# Patient Record
Sex: FEMALE | Race: BLACK OR AFRICAN AMERICAN | NOT HISPANIC OR LATINO | URBAN - METROPOLITAN AREA
[De-identification: names, ages, dates, MRNs, and addresses within clinical notes are randomized per-mention and may not be internally consistent; named-entity substitution may affect disease eponyms.]

---

## 2017-11-08 ENCOUNTER — OUTPATIENT (OUTPATIENT)
Dept: OUTPATIENT SERVICES | Facility: HOSPITAL | Age: 62
LOS: 1 days | End: 2017-11-08
Payer: COMMERCIAL

## 2017-11-08 DIAGNOSIS — Z22.321 CARRIER OR SUSPECTED CARRIER OF METHICILLIN SUSCEPTIBLE STAPHYLOCOCCUS AUREUS: ICD-10-CM

## 2017-11-08 LAB
MRSA PCR RESULT.: NEGATIVE — SIGNIFICANT CHANGE UP
S AUREUS DNA NOSE QL NAA+PROBE: NEGATIVE — SIGNIFICANT CHANGE UP

## 2017-11-08 PROCEDURE — 87641 MR-STAPH DNA AMP PROBE: CPT

## 2017-11-14 ENCOUNTER — RESULT REVIEW (OUTPATIENT)
Age: 62
End: 2017-11-14

## 2017-11-14 ENCOUNTER — INPATIENT (INPATIENT)
Facility: HOSPITAL | Age: 62
LOS: 2 days | Discharge: NURSING, REHAB OR LONG-TERM CARE | DRG: 470 | End: 2017-11-17
Attending: ORTHOPAEDIC SURGERY | Admitting: ORTHOPAEDIC SURGERY
Payer: COMMERCIAL

## 2017-11-14 VITALS
HEART RATE: 69 BPM | OXYGEN SATURATION: 100 % | DIASTOLIC BLOOD PRESSURE: 70 MMHG | SYSTOLIC BLOOD PRESSURE: 170 MMHG | HEIGHT: 66 IN | RESPIRATION RATE: 20 BRPM | WEIGHT: 205.25 LBS | TEMPERATURE: 97 F

## 2017-11-14 DIAGNOSIS — M19.90 UNSPECIFIED OSTEOARTHRITIS, UNSPECIFIED SITE: ICD-10-CM

## 2017-11-14 DIAGNOSIS — Z96.652 PRESENCE OF LEFT ARTIFICIAL KNEE JOINT: Chronic | ICD-10-CM

## 2017-11-14 PROBLEM — Z00.00 ENCOUNTER FOR PREVENTIVE HEALTH EXAMINATION: Noted: 2017-11-14

## 2017-11-14 PROCEDURE — 73560 X-RAY EXAM OF KNEE 1 OR 2: CPT | Mod: 26,RT

## 2017-11-14 RX ORDER — ACETAMINOPHEN 500 MG
650 TABLET ORAL EVERY 6 HOURS
Qty: 0 | Refills: 0 | Status: DISCONTINUED | OUTPATIENT
Start: 2017-11-14 | End: 2017-11-17

## 2017-11-14 RX ORDER — CELECOXIB 200 MG/1
200 CAPSULE ORAL
Qty: 0 | Refills: 0 | Status: DISCONTINUED | OUTPATIENT
Start: 2017-11-16 | End: 2017-11-17

## 2017-11-14 RX ORDER — POLYETHYLENE GLYCOL 3350 17 G/17G
17 POWDER, FOR SOLUTION ORAL DAILY
Qty: 0 | Refills: 0 | Status: DISCONTINUED | OUTPATIENT
Start: 2017-11-14 | End: 2017-11-17

## 2017-11-14 RX ORDER — MORPHINE SULFATE 50 MG/1
2 CAPSULE, EXTENDED RELEASE ORAL EVERY 4 HOURS
Qty: 0 | Refills: 0 | Status: DISCONTINUED | OUTPATIENT
Start: 2017-11-14 | End: 2017-11-17

## 2017-11-14 RX ORDER — ONDANSETRON 8 MG/1
4 TABLET, FILM COATED ORAL EVERY 6 HOURS
Qty: 0 | Refills: 0 | Status: DISCONTINUED | OUTPATIENT
Start: 2017-11-14 | End: 2017-11-17

## 2017-11-14 RX ORDER — HYDROMORPHONE HYDROCHLORIDE 2 MG/ML
0.5 INJECTION INTRAMUSCULAR; INTRAVENOUS; SUBCUTANEOUS
Qty: 0 | Refills: 0 | Status: DISCONTINUED | OUTPATIENT
Start: 2017-11-14 | End: 2017-11-17

## 2017-11-14 RX ORDER — CEFAZOLIN SODIUM 1 G
2000 VIAL (EA) INJECTION EVERY 8 HOURS
Qty: 0 | Refills: 0 | Status: COMPLETED | OUTPATIENT
Start: 2017-11-14 | End: 2017-11-15

## 2017-11-14 RX ORDER — ASPIRIN/CALCIUM CARB/MAGNESIUM 324 MG
325 TABLET ORAL
Qty: 0 | Refills: 0 | Status: DISCONTINUED | OUTPATIENT
Start: 2017-11-14 | End: 2017-11-17

## 2017-11-14 RX ORDER — ASCORBIC ACID 60 MG
1 TABLET,CHEWABLE ORAL
Qty: 0 | Refills: 0 | COMMUNITY

## 2017-11-14 RX ORDER — MORPHINE SULFATE 50 MG/1
4 CAPSULE, EXTENDED RELEASE ORAL ONCE
Qty: 0 | Refills: 0 | Status: DISCONTINUED | OUTPATIENT
Start: 2017-11-14 | End: 2017-11-17

## 2017-11-14 RX ORDER — FAMOTIDINE 10 MG/ML
20 INJECTION INTRAVENOUS EVERY 12 HOURS
Qty: 0 | Refills: 0 | Status: DISCONTINUED | OUTPATIENT
Start: 2017-11-14 | End: 2017-11-17

## 2017-11-14 RX ORDER — OLMESARTAN MEDOXOMIL-HYDROCHLOROTHIAZIDE 25; 40 MG/1; MG/1
1 TABLET, FILM COATED ORAL
Qty: 0 | Refills: 0 | COMMUNITY

## 2017-11-14 RX ORDER — DOCUSATE SODIUM 100 MG
100 CAPSULE ORAL THREE TIMES A DAY
Qty: 0 | Refills: 0 | Status: DISCONTINUED | OUTPATIENT
Start: 2017-11-14 | End: 2017-11-17

## 2017-11-14 RX ORDER — DIPHENHYDRAMINE HCL 50 MG
25 CAPSULE ORAL AT BEDTIME
Qty: 0 | Refills: 0 | Status: DISCONTINUED | OUTPATIENT
Start: 2017-11-14 | End: 2017-11-17

## 2017-11-14 RX ORDER — MAGNESIUM HYDROXIDE 400 MG/1
30 TABLET, CHEWABLE ORAL DAILY
Qty: 0 | Refills: 0 | Status: DISCONTINUED | OUTPATIENT
Start: 2017-11-14 | End: 2017-11-17

## 2017-11-14 RX ORDER — SENNA PLUS 8.6 MG/1
2 TABLET ORAL AT BEDTIME
Qty: 0 | Refills: 0 | Status: DISCONTINUED | OUTPATIENT
Start: 2017-11-14 | End: 2017-11-17

## 2017-11-14 RX ORDER — SODIUM CHLORIDE 9 MG/ML
1000 INJECTION, SOLUTION INTRAVENOUS
Qty: 0 | Refills: 0 | Status: DISCONTINUED | OUTPATIENT
Start: 2017-11-14 | End: 2017-11-17

## 2017-11-14 RX ORDER — PREGABALIN 225 MG/1
1 CAPSULE ORAL
Qty: 0 | Refills: 0 | COMMUNITY

## 2017-11-14 RX ORDER — OXYCODONE HYDROCHLORIDE 5 MG/1
10 TABLET ORAL EVERY 4 HOURS
Qty: 0 | Refills: 0 | Status: DISCONTINUED | OUTPATIENT
Start: 2017-11-14 | End: 2017-11-17

## 2017-11-14 RX ADMIN — SODIUM CHLORIDE 100 MILLILITER(S): 9 INJECTION, SOLUTION INTRAVENOUS at 15:14

## 2017-11-14 RX ADMIN — Medication 325 MILLIGRAM(S): at 21:10

## 2017-11-14 RX ADMIN — Medication 100 MILLIGRAM(S): at 19:41

## 2017-11-14 RX ADMIN — FAMOTIDINE 20 MILLIGRAM(S): 10 INJECTION INTRAVENOUS at 21:10

## 2017-11-14 RX ADMIN — Medication 100 MILLIGRAM(S): at 21:10

## 2017-11-14 NOTE — H&P ADULT - NSHPPHYSICALEXAM_GEN_ALL_CORE
MSK- RIGHT LE no erythema/sts/ecchymosis, decreased ROM secondary to pain, SLT INTACT, DP/PT 2+, EHL/TA/GS 5/5  Rest of PE per medical clearance

## 2017-11-14 NOTE — H&P ADULT - HISTORY OF PRESENT ILLNESS
61yo f c/o right knee pain "for years." Pt. denies any accident or injury to right knee. Pt. has had left tkr done about 5y. Pt. c/o pain inright knee and has difficulty with bending. Pt. only takes indomethacin when right knee is too stiff. Denies any numbness/tingling. Thad miller walker assistance Pt. did physical therapy when left tkr was performed. Presents today for elective RIGHT TKR, ROBOTIC ASSISTED.

## 2017-11-14 NOTE — H&P ADULT - NSHPLABSRESULTS_GEN_ALL_CORE
preop cbc/bmp/coags (no PTT) /ua wnl per medical clearance   EKG- NSR  CXR- wnl per medical clearance

## 2017-11-14 NOTE — PROGRESS NOTE ADULT - SUBJECTIVE AND OBJECTIVE BOX
Orthopaedics Post Op Check    Procedure: Right total knee replacement   Surgeon: Dr. Orta     Pt comfortable, without complaints  Denies CP, SOB, N/V, numbness/tingling     Vital Signs Last 24 Hrs  T(C): 37.1 (14 Nov 2017 14:58), Max: 37.1 (14 Nov 2017 14:58)  T(F): 98.8 (14 Nov 2017 14:58), Max: 98.8 (14 Nov 2017 14:58)  HR: 68 (14 Nov 2017 15:13) (68 - 88)  BP: 136/72 (14 Nov 2017 15:13) (136/72 - 170/70)  BP(mean): 97 (14 Nov 2017 15:13) (97 - 97)  RR: 14 (14 Nov 2017 15:13) (14 - 20)  SpO2: 100% (14 Nov 2017 15:13) (100% - 100%)  AVSS, NAD    Dressing C/D/I  General: Pt Alert and oriented     Pulses: DP pulses 2+ bilaterally  Sensation: SLT in tact and equal to distal bilateral lower extremities   Motor: EHL/FHL/TA/GS 5/5 motor strength bilateral lower extremities       Post op XR: right knee prosthesis in place     A/P: 62yFemale POD#0 s/p right total knee replacement   - Stable  - Pain Control  - DVT ppx:  bid  - Post op abx: Ancef	  - PT, WBS: WBAT  - F/U AM Labs

## 2017-11-14 NOTE — PHYSICAL THERAPY INITIAL EVALUATION ADULT - PERTINENT HX OF CURRENT PROBLEM, REHAB EVAL
63yo f c/o right knee pain "for years." Pt. denies any accident or injury to right knee. Pt. has had left tkr done about 5y. Pt. c/o pain in right knee and has difficulty with bending. Pt. only takes indomethacin when right knee is too stiff.

## 2017-11-14 NOTE — PHYSICAL THERAPY INITIAL EVALUATION ADULT - GAIT DEVIATIONS NOTED, PT EVAL
decreased gait speed, mild unsteadiness, decreased WBing throughout RLE, difficulty progressing RLE (improved throughout gait)/decreased step length/decreased stride length

## 2017-11-14 NOTE — BRIEF OPERATIVE NOTE - PROCEDURE
<<-----Click on this checkbox to enter Procedure Total knee arthroplasty  11/14/2017    Active  [id number]

## 2017-11-14 NOTE — PHYSICAL THERAPY INITIAL EVALUATION ADULT - ADDITIONAL COMMENTS
Patient lives in apartment, 9 steps to enter. Patient denies DME, home health assistance, and history of falls.

## 2017-11-15 ENCOUNTER — TRANSCRIPTION ENCOUNTER (OUTPATIENT)
Age: 62
End: 2017-11-15

## 2017-11-15 LAB
ANION GAP SERPL CALC-SCNC: 11 MMOL/L — SIGNIFICANT CHANGE UP (ref 5–17)
BUN SERPL-MCNC: 15 MG/DL — SIGNIFICANT CHANGE UP (ref 7–23)
CALCIUM SERPL-MCNC: 8.8 MG/DL — SIGNIFICANT CHANGE UP (ref 8.4–10.5)
CHLORIDE SERPL-SCNC: 95 MMOL/L — LOW (ref 96–108)
CO2 SERPL-SCNC: 27 MMOL/L — SIGNIFICANT CHANGE UP (ref 22–31)
CREAT SERPL-MCNC: 0.67 MG/DL — SIGNIFICANT CHANGE UP (ref 0.5–1.3)
GLUCOSE SERPL-MCNC: 106 MG/DL — HIGH (ref 70–99)
HCT VFR BLD CALC: 33.3 % — LOW (ref 34.5–45)
HGB BLD-MCNC: 10.7 G/DL — LOW (ref 11.5–15.5)
MCHC RBC-ENTMCNC: 28.4 PG — SIGNIFICANT CHANGE UP (ref 27–34)
MCHC RBC-ENTMCNC: 32.1 G/DL — SIGNIFICANT CHANGE UP (ref 32–36)
MCV RBC AUTO: 88.3 FL — SIGNIFICANT CHANGE UP (ref 80–100)
PLATELET # BLD AUTO: 313 K/UL — SIGNIFICANT CHANGE UP (ref 150–400)
POTASSIUM SERPL-MCNC: 4.1 MMOL/L — SIGNIFICANT CHANGE UP (ref 3.5–5.3)
POTASSIUM SERPL-SCNC: 4.1 MMOL/L — SIGNIFICANT CHANGE UP (ref 3.5–5.3)
RBC # BLD: 3.77 M/UL — LOW (ref 3.8–5.2)
RBC # FLD: 14.9 % — SIGNIFICANT CHANGE UP (ref 10.3–16.9)
SODIUM SERPL-SCNC: 133 MMOL/L — LOW (ref 135–145)
WBC # BLD: 13.1 K/UL — HIGH (ref 3.8–10.5)
WBC # FLD AUTO: 13.1 K/UL — HIGH (ref 3.8–10.5)

## 2017-11-15 RX ORDER — SENNA PLUS 8.6 MG/1
2 TABLET ORAL
Qty: 0 | Refills: 0 | COMMUNITY
Start: 2017-11-15

## 2017-11-15 RX ORDER — ASPIRIN/CALCIUM CARB/MAGNESIUM 324 MG
1 TABLET ORAL
Qty: 0 | Refills: 0 | COMMUNITY
Start: 2017-11-15

## 2017-11-15 RX ORDER — OXYCODONE HYDROCHLORIDE 5 MG/1
1 TABLET ORAL
Qty: 0 | Refills: 0 | COMMUNITY
Start: 2017-11-15

## 2017-11-15 RX ORDER — DOCUSATE SODIUM 100 MG
1 CAPSULE ORAL
Qty: 0 | Refills: 0 | COMMUNITY
Start: 2017-11-15

## 2017-11-15 RX ORDER — LOSARTAN POTASSIUM 100 MG/1
50 TABLET, FILM COATED ORAL DAILY
Qty: 0 | Refills: 0 | Status: DISCONTINUED | OUTPATIENT
Start: 2017-11-15 | End: 2017-11-17

## 2017-11-15 RX ORDER — FAMOTIDINE 10 MG/ML
1 INJECTION INTRAVENOUS
Qty: 0 | Refills: 0 | COMMUNITY
Start: 2017-11-15

## 2017-11-15 RX ORDER — CELECOXIB 200 MG/1
1 CAPSULE ORAL
Qty: 0 | Refills: 0 | COMMUNITY
Start: 2017-11-15

## 2017-11-15 RX ORDER — DIPHENHYDRAMINE HCL 50 MG
1 CAPSULE ORAL
Qty: 0 | Refills: 0 | COMMUNITY
Start: 2017-11-15

## 2017-11-15 RX ORDER — MAGNESIUM HYDROXIDE 400 MG/1
30 TABLET, CHEWABLE ORAL
Qty: 0 | Refills: 0 | COMMUNITY
Start: 2017-11-15

## 2017-11-15 RX ORDER — ONDANSETRON 8 MG/1
0 TABLET, FILM COATED ORAL
Qty: 0 | Refills: 0 | COMMUNITY
Start: 2017-11-15

## 2017-11-15 RX ADMIN — Medication 100 MILLIGRAM(S): at 02:30

## 2017-11-15 RX ADMIN — MORPHINE SULFATE 2 MILLIGRAM(S): 50 CAPSULE, EXTENDED RELEASE ORAL at 02:02

## 2017-11-15 RX ADMIN — Medication 100 MILLIGRAM(S): at 05:29

## 2017-11-15 RX ADMIN — Medication 100 MILLIGRAM(S): at 20:31

## 2017-11-15 RX ADMIN — FAMOTIDINE 20 MILLIGRAM(S): 10 INJECTION INTRAVENOUS at 17:50

## 2017-11-15 RX ADMIN — OXYCODONE HYDROCHLORIDE 10 MILLIGRAM(S): 5 TABLET ORAL at 20:10

## 2017-11-15 RX ADMIN — Medication 650 MILLIGRAM(S): at 17:25

## 2017-11-15 RX ADMIN — OXYCODONE HYDROCHLORIDE 10 MILLIGRAM(S): 5 TABLET ORAL at 19:37

## 2017-11-15 RX ADMIN — Medication 325 MILLIGRAM(S): at 17:47

## 2017-11-15 RX ADMIN — OXYCODONE HYDROCHLORIDE 10 MILLIGRAM(S): 5 TABLET ORAL at 08:00

## 2017-11-15 RX ADMIN — LOSARTAN POTASSIUM 50 MILLIGRAM(S): 100 TABLET, FILM COATED ORAL at 21:38

## 2017-11-15 RX ADMIN — OXYCODONE HYDROCHLORIDE 10 MILLIGRAM(S): 5 TABLET ORAL at 07:02

## 2017-11-15 RX ADMIN — Medication 1 TABLET(S): at 12:36

## 2017-11-15 RX ADMIN — FAMOTIDINE 20 MILLIGRAM(S): 10 INJECTION INTRAVENOUS at 05:29

## 2017-11-15 RX ADMIN — Medication 650 MILLIGRAM(S): at 16:06

## 2017-11-15 RX ADMIN — POLYETHYLENE GLYCOL 3350 17 GRAM(S): 17 POWDER, FOR SOLUTION ORAL at 12:36

## 2017-11-15 RX ADMIN — MORPHINE SULFATE 2 MILLIGRAM(S): 50 CAPSULE, EXTENDED RELEASE ORAL at 02:20

## 2017-11-15 RX ADMIN — Medication 100 MILLIGRAM(S): at 13:45

## 2017-11-15 RX ADMIN — OXYCODONE HYDROCHLORIDE 10 MILLIGRAM(S): 5 TABLET ORAL at 11:29

## 2017-11-15 RX ADMIN — Medication 325 MILLIGRAM(S): at 05:29

## 2017-11-15 RX ADMIN — OXYCODONE HYDROCHLORIDE 10 MILLIGRAM(S): 5 TABLET ORAL at 12:00

## 2017-11-15 NOTE — DISCHARGE NOTE ADULT - PLAN OF CARE
Improvement in pain and ambulation after surgery No strenuous activity, heavy lifting, driving or returning to work until cleared by MD.  You may shower - dressing is water-resistant, no soaking in bathtubs.  Remove dressing after post op day 5-7, then leave incision open to air. Keep incision clean and dry.  Try to have regular bowel movements, take stool softener or laxative if necessary.  May take Pepcid or Zantac for upset stomach.  May take Aleve or Naproxen instead of Meloxicam.  Swelling may travel all the way down leg to foot, this is normal and will subside in a few weeks.  Call to schedule an appt with Dr. Parker for follow up, if you have staples or sutures they will be removed in office.  Contact your doctor if you experience: fever greater than 101.5, chills, chest pain, difficulty breathing, redness or excessive drainage around the incision, other concerns. No strenuous activity, heavy lifting, driving or returning to work until cleared by MD.  You may shower - dressing is water-resistant, no soaking in bathtubs.  Remove dressing after post op day 5-7, then leave incision open to air. Keep incision clean and dry.  Try to have regular bowel movements, take stool softener or laxative if necessary.  May take Pepcid or Zantac for upset stomach.  May take Aleve or Naproxen instead of Meloxicam.  Swelling may travel all the way down leg to foot, this is normal and will subside in a few weeks.  Call to schedule an appt with Dr. Orta for follow up, if you have staples or sutures they will be removed in office.  Contact your doctor if you experience: fever greater than 101.5, chills, chest pain, difficulty breathing, redness or excessive drainage around the incision, other concerns.  Please follow up with your primary care provider.

## 2017-11-15 NOTE — PROGRESS NOTE ADULT - SUBJECTIVE AND OBJECTIVE BOX
Ortho Progress Note    Subjective:  62y Female s/p  R TKA , POD# 1. Patient seen and examined, doing well, pain endorsed but controlled. No acute events overnight. Denies CP/SOB/N/V      Objective:    Vital Signs Last 24 Hrs  T(C): 37.3 (15 Nov 2017 05:13), Max: 37.7 (15 Nov 2017 01:55)  T(F): 99.1 (15 Nov 2017 05:13), Max: 99.9 (15 Nov 2017 01:55)  HR: 89 (15 Nov 2017 05:13) (56 - 89)  BP: 140/67 (15 Nov 2017 05:13) (135/83 - 171/81)  BP(mean): 97 (14 Nov 2017 15:13) (97 - 97)  RR: 17 (15 Nov 2017 05:13) (12 - 20)  SpO2: 97% (15 Nov 2017 05:13) (96% - 100%)    NAD, AOx3, comfortable    RLE  Dressing: C/D/I  Motor: 5/5 GS/TA/EHL/FHL    Sensation: SILT s/sp/dp/tib  Pulses:  DP/PT 2+ , toes/foot WWP            A/P:  62y Female s/p R TKA , POD# 1  -Stable  -Pain Control  -PT/WBAT  -DVT ppx:  BID  -Advance diet as tolerated  -f/u AM labs  -Dispo: home v rehab      Adonis Mccracken MD, PGY-1  [phone]

## 2017-11-15 NOTE — DISCHARGE NOTE ADULT - MEDICATION SUMMARY - MEDICATIONS TO TAKE
I will START or STAY ON the medications listed below when I get home from the hospital:    celecoxib 200 mg oral capsule  -- 1 cap(s) by mouth 2 times a day (before meals)  -- Indication: For Anti-inflammatory    oxyCODONE 10 mg oral tablet  -- 1 tab(s) by mouth every 4 hours, As needed, Moderate Pain  -- Indication: For Pain    aspirin 325 mg oral delayed release tablet  -- 1 tab(s) by mouth 2 times a day  -- Indication: For DVT prophylaxis    losartan 50 mg oral tablet  -- 1 tab(s) by mouth once a day  -- Indication: For Hypertension    aluminum hydroxide-magnesium hydroxide 200 mg-200 mg/5 mL oral suspension  -- 30 milliliter(s) by mouth 4 times a day, As needed, Indigestion  -- Indication: For Indigestion    magnesium hydroxide 8% oral suspension  -- 30 milliliter(s) by mouth once a day, As needed, Constipation  -- Indication: For Indigestion    diphenhydrAMINE 25 mg oral capsule  -- 1 cap(s) by mouth once a day (at bedtime), As needed, Insomnia  -- Indication: For Insomnia    ondansetron 2 mg/mL injectable solution  --  injectable   -- Indication: For Nausea    Benicar HCT 20 mg-12.5 mg oral tablet  -- 1 tab(s) by mouth once a day  -- Indication: For Hypertension    famotidine 20 mg oral tablet  -- 1 tab(s) by mouth every 12 hours  -- Indication: For Indigestion    docusate sodium 100 mg oral capsule  -- 1 cap(s) by mouth 3 times a day  -- Indication: For Constipation    senna oral tablet  -- 2 tab(s) by mouth once a day (at bedtime), As needed, Constipation  -- Indication: For Constipation    polyethylene glycol 3350 oral powder for reconstitution  -- 17 gram(s) by mouth once a day  -- Indication: For Constipation    Vitamin B12 500 mcg oral tablet  -- 1 tab(s) by mouth once a day  -- Indication: For Vitamin    Vitamin C 1000 mg oral tablet  -- 1 tab(s) by mouth once a day  -- Indication: For Vitamin

## 2017-11-15 NOTE — DISCHARGE NOTE ADULT - CARE PROVIDER_API CALL
Sharif Stroud), Orthopaedic Surgery  [address] [place] [address]  Phone: [phone]  Fax: [phone]

## 2017-11-15 NOTE — DISCHARGE NOTE ADULT - HOSPITAL COURSE
Admitted  Surgery RIGHT TKR  Noreen-op Antibiotics  Pain control  DVT prophylaxis- ASA  OOB/Physical Therapy Admitted  Surgery Right TKR  Noreen-op Antibiotics  Pain control  DVT prophylaxis - ASA 325mg BID  OOB/Physical Therapy

## 2017-11-15 NOTE — DISCHARGE NOTE ADULT - CARE PLAN
Principal Discharge DX:	Arthritis  Goal:	Improvement in pain and ambulation after surgery  Instructions for follow-up, activity and diet:	No strenuous activity, heavy lifting, driving or returning to work until cleared by MD.  You may shower - dressing is water-resistant, no soaking in bathtubs.  Remove dressing after post op day 5-7, then leave incision open to air. Keep incision clean and dry.  Try to have regular bowel movements, take stool softener or laxative if necessary.  May take Pepcid or Zantac for upset stomach.  May take Aleve or Naproxen instead of Meloxicam.  Swelling may travel all the way down leg to foot, this is normal and will subside in a few weeks.  Call to schedule an appt with Dr. Parker for follow up, if you have staples or sutures they will be removed in office.  Contact your doctor if you experience: fever greater than 101.5, chills, chest pain, difficulty breathing, redness or excessive drainage around the incision, other concerns. Principal Discharge DX:	Arthritis  Goal:	Improvement in pain and ambulation after surgery  Instructions for follow-up, activity and diet:	No strenuous activity, heavy lifting, driving or returning to work until cleared by MD.  You may shower - dressing is water-resistant, no soaking in bathtubs.  Remove dressing after post op day 5-7, then leave incision open to air. Keep incision clean and dry.  Try to have regular bowel movements, take stool softener or laxative if necessary.  May take Pepcid or Zantac for upset stomach.  May take Aleve or Naproxen instead of Meloxicam.  Swelling may travel all the way down leg to foot, this is normal and will subside in a few weeks.  Call to schedule an appt with Dr. Orta for follow up, if you have staples or sutures they will be removed in office.  Contact your doctor if you experience: fever greater than 101.5, chills, chest pain, difficulty breathing, redness or excessive drainage around the incision, other concerns.  Please follow up with your primary care provider.

## 2017-11-15 NOTE — DISCHARGE NOTE ADULT - MEDICATION SUMMARY - MEDICATIONS TO STOP TAKING
I will STOP taking the medications listed below when I get home from the hospital:    indomethacin 75 mg oral capsule, extended release  -- 1 cap(s) by mouth once a day

## 2017-11-15 NOTE — PROGRESS NOTE ADULT - SUBJECTIVE AND OBJECTIVE BOX
no complaints    vss    rle nvid  dress c d i  5/5 at/pt  no dct    s/p r tka pod 1    oob with pt  check labs and vitals  cont abx and anticoag  provide MARNIE stockings and CPM  disposition planning

## 2017-11-15 NOTE — DISCHARGE NOTE ADULT - PATIENT PORTAL LINK FT
“You can access the [employer] Patient Portal, offered by [facility], by registering with the following website: [email or web address]”

## 2017-11-15 NOTE — PROGRESS NOTE ADULT - SUBJECTIVE AND OBJECTIVE BOX
POST OPERATIVE DAY #:1   STATUS POST: Right  TKA                       SUBJECTIVE: Patient seen and examined. Pt. c/o pain in right knee but otherwise denies any sob/cp/n/v.     Pain:  controlled      OBJECTIVE:     Vital Signs Last 24 Hrs  T(C): 37.9 (15 Nov 2017 09:55), Max: 37.9 (15 Nov 2017 09:55)  T(F): 100.3 (15 Nov 2017 09:55), Max: 100.3 (15 Nov 2017 09:55)  HR: 85 (15 Nov 2017 09:55) (56 - 89)  BP: 153/72 (15 Nov 2017 09:55) (135/83 - 171/81)  BP(mean): 97 (14 Nov 2017 15:13) (97 - 97)  RR: 16 (15 Nov 2017 09:55) (12 - 20)  SpO2: 96% (15 Nov 2017 09:55) (96% - 100%)    Affected extremity:          Dressing: clean/dry/intact          Sensation: intact to light touch to patient's baseline         Motor exam:  firing EHL          warm, well-perfused; capillary refill < 3 seconds              I&O's Detail    14 Nov 2017 07:01  -  15 Nov 2017 07:00  --------------------------------------------------------  IN:    lactated ringers.: 1400 mL    Oral Fluid: 360 mL  Total IN: 1760 mL    OUT:    Voided: 950 mL  Total OUT: 950 mL    Total NET: 810 mL      15 Nov 2017 07:01  -  15 Nov 2017 12:29  --------------------------------------------------------  IN:  Total IN: 0 mL    OUT:    Voided: 1050 mL  Total OUT: 1050 mL    Total NET: -1050 mL          LABS:                        10.7   13.1  )-----------( 313      ( 15 Nov 2017 06:26 )             33.3     11-15    133<L>  |  95<L>  |  15  ----------------------------<  106<H>  4.1   |  27  |  0.67    Ca    8.8      15 Nov 2017 06:26            MEDICATIONS:    acetaminophen   Tablet 650 milliGRAM(s) Oral every 6 hours PRN  acetaminophen   Tablet. 650 milliGRAM(s) Oral every 6 hours PRN  diphenhydrAMINE   Capsule 25 milliGRAM(s) Oral at bedtime PRN  HYDROmorphone  Injectable 0.5 milliGRAM(s) IV Push every 10 minutes PRN  morphine  - Injectable 2 milliGRAM(s) IV Push every 4 hours PRN  morphine  - Injectable 4 milliGRAM(s) IV Push once  ondansetron Injectable 4 milliGRAM(s) IV Push every 6 hours PRN  oxyCODONE    IR 10 milliGRAM(s) Oral every 4 hours PRN    aspirin enteric coated 325 milliGRAM(s) Oral two times a day      RADIOLOGY & ADDITIONAL STUDIES:    ASSESSMENT AND PLAN:     1. Analgesic pain control  2. DVT prophylaxis: ASA    3. Weight Bearing Status:  Weight bearing as tolerated         4. Disposition: Home POST OPERATIVE DAY #:1   STATUS POST: Right  TKA                       SUBJECTIVE: Patient seen and examined. Pt. c/o pain in right knee but otherwise denies any sob/cp/n/v.     Pain:  controlled      OBJECTIVE:     Vital Signs Last 24 Hrs  T(C): 37.9 (15 Nov 2017 09:55), Max: 37.9 (15 Nov 2017 09:55)  T(F): 100.3 (15 Nov 2017 09:55), Max: 100.3 (15 Nov 2017 09:55)  HR: 85 (15 Nov 2017 09:55) (56 - 89)  BP: 153/72 (15 Nov 2017 09:55) (135/83 - 171/81)  BP(mean): 97 (14 Nov 2017 15:13) (97 - 97)  RR: 16 (15 Nov 2017 09:55) (12 - 20)  SpO2: 96% (15 Nov 2017 09:55) (96% - 100%)    Affected extremity: right le         Dressing: clean/dry/intact          Sensation: intact to light touch to patient's baseline         Motor exam:  DP/PT 2+, EHL/TA/GS 5/5 b/l les         warm, well-perfused; capillary refill < 3 seconds              I&O's Detail    14 Nov 2017 07:01  -  15 Nov 2017 07:00  --------------------------------------------------------  IN:    lactated ringers.: 1400 mL    Oral Fluid: 360 mL  Total IN: 1760 mL    OUT:    Voided: 950 mL  Total OUT: 950 mL    Total NET: 810 mL      15 Nov 2017 07:01  -  15 Nov 2017 12:29  --------------------------------------------------------  IN:  Total IN: 0 mL    OUT:    Voided: 1050 mL  Total OUT: 1050 mL    Total NET: -1050 mL          LABS:                        10.7   13.1  )-----------( 313      ( 15 Nov 2017 06:26 )             33.3     11-15    133<L>  |  95<L>  |  15  ----------------------------<  106<H>  4.1   |  27  |  0.67    Ca    8.8      15 Nov 2017 06:26            MEDICATIONS:    acetaminophen   Tablet 650 milliGRAM(s) Oral every 6 hours PRN  acetaminophen   Tablet. 650 milliGRAM(s) Oral every 6 hours PRN  diphenhydrAMINE   Capsule 25 milliGRAM(s) Oral at bedtime PRN  HYDROmorphone  Injectable 0.5 milliGRAM(s) IV Push every 10 minutes PRN  morphine  - Injectable 2 milliGRAM(s) IV Push every 4 hours PRN  morphine  - Injectable 4 milliGRAM(s) IV Push once  ondansetron Injectable 4 milliGRAM(s) IV Push every 6 hours PRN  oxyCODONE    IR 10 milliGRAM(s) Oral every 4 hours PRN    aspirin enteric coated 325 milliGRAM(s) Oral two times a day      RADIOLOGY & ADDITIONAL STUDIES: none    ASSESSMENT AND PLAN:     1. Analgesic pain control  2. DVT prophylaxis: ASA    3. Weight Bearing Status:  Weight bearing as tolerated         4. Disposition: Home

## 2017-11-16 LAB
ANION GAP SERPL CALC-SCNC: 12 MMOL/L — SIGNIFICANT CHANGE UP (ref 5–17)
BUN SERPL-MCNC: 13 MG/DL — SIGNIFICANT CHANGE UP (ref 7–23)
CALCIUM SERPL-MCNC: 8.8 MG/DL — SIGNIFICANT CHANGE UP (ref 8.4–10.5)
CHLORIDE SERPL-SCNC: 94 MMOL/L — LOW (ref 96–108)
CO2 SERPL-SCNC: 26 MMOL/L — SIGNIFICANT CHANGE UP (ref 22–31)
CREAT SERPL-MCNC: 0.78 MG/DL — SIGNIFICANT CHANGE UP (ref 0.5–1.3)
GLUCOSE SERPL-MCNC: 135 MG/DL — HIGH (ref 70–99)
HCT VFR BLD CALC: 31.9 % — LOW (ref 34.5–45)
HGB BLD-MCNC: 10.2 G/DL — LOW (ref 11.5–15.5)
MCHC RBC-ENTMCNC: 28.4 PG — SIGNIFICANT CHANGE UP (ref 27–34)
MCHC RBC-ENTMCNC: 32 G/DL — SIGNIFICANT CHANGE UP (ref 32–36)
MCV RBC AUTO: 88.9 FL — SIGNIFICANT CHANGE UP (ref 80–100)
PLATELET # BLD AUTO: 289 K/UL — SIGNIFICANT CHANGE UP (ref 150–400)
POTASSIUM SERPL-MCNC: 3.7 MMOL/L — SIGNIFICANT CHANGE UP (ref 3.5–5.3)
POTASSIUM SERPL-SCNC: 3.7 MMOL/L — SIGNIFICANT CHANGE UP (ref 3.5–5.3)
RBC # BLD: 3.59 M/UL — LOW (ref 3.8–5.2)
RBC # FLD: 14.8 % — SIGNIFICANT CHANGE UP (ref 10.3–16.9)
SODIUM SERPL-SCNC: 132 MMOL/L — LOW (ref 135–145)
SURGICAL PATHOLOGY STUDY: SIGNIFICANT CHANGE UP
WBC # BLD: 13.2 K/UL — HIGH (ref 3.8–10.5)
WBC # FLD AUTO: 13.2 K/UL — HIGH (ref 3.8–10.5)

## 2017-11-16 RX ADMIN — FAMOTIDINE 20 MILLIGRAM(S): 10 INJECTION INTRAVENOUS at 17:17

## 2017-11-16 RX ADMIN — CELECOXIB 200 MILLIGRAM(S): 200 CAPSULE ORAL at 17:17

## 2017-11-16 RX ADMIN — OXYCODONE HYDROCHLORIDE 10 MILLIGRAM(S): 5 TABLET ORAL at 17:18

## 2017-11-16 RX ADMIN — POLYETHYLENE GLYCOL 3350 17 GRAM(S): 17 POWDER, FOR SOLUTION ORAL at 11:04

## 2017-11-16 RX ADMIN — OXYCODONE HYDROCHLORIDE 10 MILLIGRAM(S): 5 TABLET ORAL at 02:00

## 2017-11-16 RX ADMIN — CELECOXIB 200 MILLIGRAM(S): 200 CAPSULE ORAL at 06:45

## 2017-11-16 RX ADMIN — OXYCODONE HYDROCHLORIDE 10 MILLIGRAM(S): 5 TABLET ORAL at 11:04

## 2017-11-16 RX ADMIN — FAMOTIDINE 20 MILLIGRAM(S): 10 INJECTION INTRAVENOUS at 06:00

## 2017-11-16 RX ADMIN — Medication 100 MILLIGRAM(S): at 05:59

## 2017-11-16 RX ADMIN — Medication 100 MILLIGRAM(S): at 21:15

## 2017-11-16 RX ADMIN — CELECOXIB 200 MILLIGRAM(S): 200 CAPSULE ORAL at 05:59

## 2017-11-16 RX ADMIN — OXYCODONE HYDROCHLORIDE 10 MILLIGRAM(S): 5 TABLET ORAL at 18:15

## 2017-11-16 RX ADMIN — Medication 1 TABLET(S): at 11:04

## 2017-11-16 RX ADMIN — Medication 325 MILLIGRAM(S): at 06:00

## 2017-11-16 RX ADMIN — OXYCODONE HYDROCHLORIDE 10 MILLIGRAM(S): 5 TABLET ORAL at 02:45

## 2017-11-16 RX ADMIN — Medication 325 MILLIGRAM(S): at 17:17

## 2017-11-16 RX ADMIN — Medication 30 MILLILITER(S): at 06:00

## 2017-11-16 RX ADMIN — OXYCODONE HYDROCHLORIDE 10 MILLIGRAM(S): 5 TABLET ORAL at 12:00

## 2017-11-16 RX ADMIN — Medication 100 MILLIGRAM(S): at 17:17

## 2017-11-16 RX ADMIN — CELECOXIB 200 MILLIGRAM(S): 200 CAPSULE ORAL at 17:57

## 2017-11-16 NOTE — PROGRESS NOTE ADULT - SUBJECTIVE AND OBJECTIVE BOX
Ortho Progress Note    Subjective:  62y Female s/p  R TKA , POD# 2. Patient seen and examined, doing well, pain endorsed but controlled. No acute events overnight. Denies CP/SOB/N/V      Objective:    Vital Signs Last 24 Hrs  T(C): 37.7 (16 Nov 2017 05:15), Max: 37.9 (15 Nov 2017 09:55)  T(F): 99.8 (16 Nov 2017 05:15), Max: 100.3 (15 Nov 2017 09:55)  HR: 84 (16 Nov 2017 05:15) (77 - 86)  BP: 103/67 (16 Nov 2017 05:15) (103/67 - 165/97)  BP(mean): --  RR: 18 (16 Nov 2017 05:15) (16 - 18)  SpO2: 95% (16 Nov 2017 05:15) (95% - 97%)  NAD, AOx3, comfortable    RLE  Dressing: C/D/I  Motor: 5/5 GS/TA/EHL/FHL    Sensation: SILT s/sp/dp/tib  Pulses:  DP/PT 2+ , toes/foot WWP            A/P:  62y Female s/p R TKA , POD# 2  -Stable  -Pain Control  -PT/WBAT  -DVT ppx:  BID  -Advance diet as tolerated  -f/u AM labs  -Dispo: rehab      Adonis Mccracken MD, PGY-1  [phone]

## 2017-11-16 NOTE — PROGRESS NOTE ADULT - SUBJECTIVE AND OBJECTIVE BOX
Ortho Note    Pt comfortable without complaints, pain controlled  Denies CP, SOB, N/V, numbness/tingling     Vital Signs Last 24 Hrs  AVSS    General: Pt Alert and oriented, NAD  DSG C/D/I Right knee, bulky wrap d/c'd  Pulses intact RLE  Sensation intact RLE  Motor: EHL/FHL/TA/GS 5/5 RLE                          10.2   13.2  )-----------( 289      ( 16 Nov 2017 07:01 )             31.9   16 Nov 2017 07:01    132    |  94     |  13     ----------------------------<  135    3.7     |  26     |  0.78           A/P: 62yFemale POD#2 s/p Right TKR  - Stable  - Pain Control  - DVT ppx: ASA  - PT, WBS: WBAT  - Fluid restrict for hyponatremia, f/u AM labs    Ortho Pager [phone]

## 2017-11-17 VITALS
OXYGEN SATURATION: 97 % | TEMPERATURE: 99 F | DIASTOLIC BLOOD PRESSURE: 55 MMHG | RESPIRATION RATE: 16 BRPM | SYSTOLIC BLOOD PRESSURE: 118 MMHG | HEART RATE: 63 BPM

## 2017-11-17 LAB
ANION GAP SERPL CALC-SCNC: 12 MMOL/L — SIGNIFICANT CHANGE UP (ref 5–17)
BUN SERPL-MCNC: 19 MG/DL — SIGNIFICANT CHANGE UP (ref 7–23)
CALCIUM SERPL-MCNC: 8.5 MG/DL — SIGNIFICANT CHANGE UP (ref 8.4–10.5)
CHLORIDE SERPL-SCNC: 97 MMOL/L — SIGNIFICANT CHANGE UP (ref 96–108)
CO2 SERPL-SCNC: 26 MMOL/L — SIGNIFICANT CHANGE UP (ref 22–31)
CREAT SERPL-MCNC: 0.83 MG/DL — SIGNIFICANT CHANGE UP (ref 0.5–1.3)
GLUCOSE SERPL-MCNC: 113 MG/DL — HIGH (ref 70–99)
HCT VFR BLD CALC: 30.4 % — LOW (ref 34.5–45)
HGB BLD-MCNC: 9.9 G/DL — LOW (ref 11.5–15.5)
MCHC RBC-ENTMCNC: 29 PG — SIGNIFICANT CHANGE UP (ref 27–34)
MCHC RBC-ENTMCNC: 32.6 G/DL — SIGNIFICANT CHANGE UP (ref 32–36)
MCV RBC AUTO: 89.1 FL — SIGNIFICANT CHANGE UP (ref 80–100)
PLATELET # BLD AUTO: 267 K/UL — SIGNIFICANT CHANGE UP (ref 150–400)
POTASSIUM SERPL-MCNC: 4.4 MMOL/L — SIGNIFICANT CHANGE UP (ref 3.5–5.3)
POTASSIUM SERPL-SCNC: 4.4 MMOL/L — SIGNIFICANT CHANGE UP (ref 3.5–5.3)
RBC # BLD: 3.41 M/UL — LOW (ref 3.8–5.2)
RBC # FLD: 15.1 % — SIGNIFICANT CHANGE UP (ref 10.3–16.9)
SODIUM SERPL-SCNC: 135 MMOL/L — SIGNIFICANT CHANGE UP (ref 135–145)
WBC # BLD: 13.1 K/UL — HIGH (ref 3.8–10.5)
WBC # FLD AUTO: 13.1 K/UL — HIGH (ref 3.8–10.5)

## 2017-11-17 PROCEDURE — 97161 PT EVAL LOW COMPLEX 20 MIN: CPT

## 2017-11-17 PROCEDURE — 97116 GAIT TRAINING THERAPY: CPT

## 2017-11-17 PROCEDURE — C1776: CPT

## 2017-11-17 PROCEDURE — 80048 BASIC METABOLIC PNL TOTAL CA: CPT

## 2017-11-17 PROCEDURE — 36415 COLL VENOUS BLD VENIPUNCTURE: CPT

## 2017-11-17 PROCEDURE — C1889: CPT

## 2017-11-17 PROCEDURE — 73560 X-RAY EXAM OF KNEE 1 OR 2: CPT

## 2017-11-17 PROCEDURE — C1713: CPT

## 2017-11-17 PROCEDURE — S2900: CPT

## 2017-11-17 PROCEDURE — 85027 COMPLETE CBC AUTOMATED: CPT

## 2017-11-17 PROCEDURE — 88300 SURGICAL PATH GROSS: CPT

## 2017-11-17 RX ORDER — LOSARTAN POTASSIUM 100 MG/1
1 TABLET, FILM COATED ORAL
Qty: 0 | Refills: 0 | COMMUNITY
Start: 2017-11-17

## 2017-11-17 RX ORDER — POLYETHYLENE GLYCOL 3350 17 G/17G
17 POWDER, FOR SOLUTION ORAL
Qty: 0 | Refills: 0 | COMMUNITY
Start: 2017-11-17

## 2017-11-17 RX ORDER — INDOMETHACIN 50 MG
1 CAPSULE ORAL
Qty: 0 | Refills: 0 | COMMUNITY

## 2017-11-17 RX ADMIN — Medication 1 TABLET(S): at 11:19

## 2017-11-17 RX ADMIN — POLYETHYLENE GLYCOL 3350 17 GRAM(S): 17 POWDER, FOR SOLUTION ORAL at 11:20

## 2017-11-17 RX ADMIN — CELECOXIB 200 MILLIGRAM(S): 200 CAPSULE ORAL at 06:44

## 2017-11-17 RX ADMIN — OXYCODONE HYDROCHLORIDE 10 MILLIGRAM(S): 5 TABLET ORAL at 11:25

## 2017-11-17 RX ADMIN — Medication 100 MILLIGRAM(S): at 13:48

## 2017-11-17 RX ADMIN — Medication 325 MILLIGRAM(S): at 05:43

## 2017-11-17 RX ADMIN — OXYCODONE HYDROCHLORIDE 10 MILLIGRAM(S): 5 TABLET ORAL at 06:00

## 2017-11-17 RX ADMIN — CELECOXIB 200 MILLIGRAM(S): 200 CAPSULE ORAL at 06:15

## 2017-11-17 RX ADMIN — LOSARTAN POTASSIUM 50 MILLIGRAM(S): 100 TABLET, FILM COATED ORAL at 05:43

## 2017-11-17 RX ADMIN — OXYCODONE HYDROCHLORIDE 10 MILLIGRAM(S): 5 TABLET ORAL at 05:10

## 2017-11-17 RX ADMIN — Medication 100 MILLIGRAM(S): at 05:43

## 2017-11-17 RX ADMIN — FAMOTIDINE 20 MILLIGRAM(S): 10 INJECTION INTRAVENOUS at 05:43

## 2017-11-17 RX ADMIN — OXYCODONE HYDROCHLORIDE 10 MILLIGRAM(S): 5 TABLET ORAL at 10:25

## 2017-11-20 DIAGNOSIS — I10 ESSENTIAL (PRIMARY) HYPERTENSION: ICD-10-CM

## 2017-11-20 DIAGNOSIS — M17.11 UNILATERAL PRIMARY OSTEOARTHRITIS, RIGHT KNEE: ICD-10-CM

## 2017-11-20 DIAGNOSIS — Z96.652 PRESENCE OF LEFT ARTIFICIAL KNEE JOINT: ICD-10-CM

## 2017-11-20 DIAGNOSIS — E66.9 OBESITY, UNSPECIFIED: ICD-10-CM

## 2021-04-23 NOTE — PHYSICAL THERAPY INITIAL EVALUATION ADULT - ACTIVE RANGE OF MOTION EXAMINATION, REHAB EVAL
No Vaccines Administered. bilateral upper extremity Active ROM was WFL (within functional limits)/bilateral  lower extremity Active ROM was WFL (within functional limits)/except R knee flexion/extension